# Patient Record
Sex: MALE | Employment: FULL TIME | URBAN - METROPOLITAN AREA
[De-identification: names, ages, dates, MRNs, and addresses within clinical notes are randomized per-mention and may not be internally consistent; named-entity substitution may affect disease eponyms.]

---

## 2017-04-25 ENCOUNTER — TELEPHONE (OUTPATIENT)
Dept: FAMILY MEDICINE CLINIC | Facility: CLINIC | Age: 62
End: 2017-04-25

## 2017-11-07 ENCOUNTER — TELEPHONE (OUTPATIENT)
Dept: FAMILY MEDICINE CLINIC | Facility: CLINIC | Age: 62
End: 2017-11-07

## 2017-11-08 NOTE — TELEPHONE ENCOUNTER
Please call patient. (letters were sent back undelivered) and ask if he is still coming to our office for his medical needs. If he changed PCP please remove my name as his PCP. Please update [facility] and me. Thank you .

## 2018-04-03 ENCOUNTER — PATIENT OUTREACH (OUTPATIENT)
Dept: FAMILY MEDICINE CLINIC | Facility: CLINIC | Age: 63
End: 2018-04-03

## 2018-04-03 NOTE — PROGRESS NOTES
Previous outreaches and letters were undeliverable. Pt has not had office visit with Dr Geoffrey Frey since 10/2015. Please inquire if pt has a new PCP or will re-establish care with Dr Geoffrey Frey.

## 2018-04-06 NOTE — PROGRESS NOTES
Tried back in November also phone number is busy, we also mailed several times and keeps getting returned. Will try one more time then Karen Romero advised to send PCP removal letter with above info.

## 2018-04-10 NOTE — PROGRESS NOTES
Same as below number is busy   Printed and gave to [facility] to try and get a PCP removal